# Patient Record
Sex: MALE
[De-identification: names, ages, dates, MRNs, and addresses within clinical notes are randomized per-mention and may not be internally consistent; named-entity substitution may affect disease eponyms.]

---

## 2020-07-03 ENCOUNTER — HOSPITAL ENCOUNTER (EMERGENCY)
Dept: HOSPITAL 62 - ER | Age: 35
Discharge: HOME | End: 2020-07-03
Payer: SELF-PAY

## 2020-07-03 VITALS — SYSTOLIC BLOOD PRESSURE: 129 MMHG | DIASTOLIC BLOOD PRESSURE: 77 MMHG

## 2020-07-03 DIAGNOSIS — Z91.013: ICD-10-CM

## 2020-07-03 DIAGNOSIS — R51: Primary | ICD-10-CM

## 2020-07-03 LAB
ABSOLUTE LYMPHOCYTES# (MANUAL): 4.4 10^3/UL (ref 0.5–4.7)
ABSOLUTE MONOCYTES # (MANUAL): 0.5 10^3/UL (ref 0.1–1.4)
ADD MANUAL DIFF: YES
ALBUMIN SERPL-MCNC: 4 G/DL (ref 3.5–5)
ALP SERPL-CCNC: 116 U/L (ref 38–126)
ANION GAP SERPL CALC-SCNC: 7 MMOL/L (ref 5–19)
AST SERPL-CCNC: 147 U/L (ref 17–59)
BASOPHILS NFR BLD MANUAL: 0 % (ref 0–2)
BILIRUB DIRECT SERPL-MCNC: 0.1 MG/DL (ref 0–0.4)
BILIRUB SERPL-MCNC: 0.5 MG/DL (ref 0.2–1.3)
BUN SERPL-MCNC: 15 MG/DL (ref 7–20)
BURR CELLS BLD QL SMEAR: (no result)
CALCIUM: 9.3 MG/DL (ref 8.4–10.2)
CHLORIDE SERPL-SCNC: 105 MMOL/L (ref 98–107)
CO2 SERPL-SCNC: 25 MMOL/L (ref 22–30)
EOSINOPHIL NFR BLD MANUAL: 5 % (ref 0–6)
ERYTHROCYTE [DISTWIDTH] IN BLOOD BY AUTOMATED COUNT: 14 % (ref 11.5–14)
ERYTHROCYTE [SEDIMENTATION RATE] IN BLOOD: 17 MM/HR (ref 0–15)
GLUCOSE SERPL-MCNC: 104 MG/DL (ref 75–110)
HCT VFR BLD CALC: 41.6 % (ref 37.9–51)
HGB BLD-MCNC: 13.9 G/DL (ref 13.5–17)
MCH RBC QN AUTO: 27.9 PG (ref 27–33.4)
MCHC RBC AUTO-ENTMCNC: 33.3 G/DL (ref 32–36)
MCV RBC AUTO: 84 FL (ref 80–97)
MONOCYTES % (MANUAL): 7 % (ref 3–13)
NEUTS BAND NFR BLD MANUAL: 1 % (ref 3–5)
PLATELET # BLD: 227 10^3/UL (ref 150–450)
PLATELET COMMENT: ADEQUATE
POTASSIUM SERPL-SCNC: 3.9 MMOL/L (ref 3.6–5)
PROT SERPL-MCNC: 8 G/DL (ref 6.3–8.2)
RBC # BLD AUTO: 4.97 10^6/UL (ref 4.35–5.55)
SCHISTOCYTES BLD QL SMEAR: SLIGHT
SEGMENTED NEUTROPHILS % (MAN): 27 % (ref 42–78)
TOTAL CELLS COUNTED BLD: 100
VARIANT LYMPHS NFR BLD MANUAL: 54 % (ref 13–45)
WBC # BLD AUTO: 7.3 10^3/UL (ref 4–10.5)

## 2020-07-03 PROCEDURE — 99284 EMERGENCY DEPT VISIT MOD MDM: CPT

## 2020-07-03 PROCEDURE — 96374 THER/PROPH/DIAG INJ IV PUSH: CPT

## 2020-07-03 PROCEDURE — 85652 RBC SED RATE AUTOMATED: CPT

## 2020-07-03 PROCEDURE — 85025 COMPLETE CBC W/AUTO DIFF WBC: CPT

## 2020-07-03 PROCEDURE — 36415 COLL VENOUS BLD VENIPUNCTURE: CPT

## 2020-07-03 PROCEDURE — 96361 HYDRATE IV INFUSION ADD-ON: CPT

## 2020-07-03 PROCEDURE — 80053 COMPREHEN METABOLIC PANEL: CPT

## 2020-07-03 PROCEDURE — 70450 CT HEAD/BRAIN W/O DYE: CPT

## 2020-07-03 NOTE — RADIOLOGY REPORT (SQ)
EXAM DESCRIPTION:







CLINICAL HISTORY: 



34 years  Male  skull fx on xrays at dr's office 2day +HAx1m



COMPARISON: 



None



TECHNIQUE: 



Axial images without IV contrast. Sagittal coronal

reconstruction. This exam was performed according to our

departmental dose-optimization program, which includes automated

exposure control, adjustment of the mA and/or kV according to

patient size and/or use of iterative reconstruction technique..



FINDINGS: 



Normal size ventricles. No suspicious intra-axial or extra-axial

abnormality. Minimal inflammatory changes in the left maxillary

sinus. Mastoid air cells are unremarkable. No suspicious skull

fracture. There is mild sclerosis surrounding the lambdoid suture

posteriorly with very minimal central lucency. Series 400 image

36. This is not a a skull fracture.



IMPRESSION: 





1. No suspicious intracranial abnormalities.

2. Minimal lucency and sclerosis associated with the lambdoid

suture chronic in nature. No acute skull fracture.

3. Minimal mucosal thickening or fluid in the left maxillary

sinus.

## 2020-07-03 NOTE — ER DOCUMENT REPORT
ED Medical Screen (RME)





- General


Chief Complaint: Headache


Stated Complaint: SENT FROM [facility] FOR CT


Time Seen by Provider: 07/03/20 19:50





- HPI


Notes: 





07/03/20 20:01


34-year-old male presents emergency room from [facility] due to a skull 

fracture seen on x-rays that they did today for complaint of a headache x1 

month.  Patient states he has not had any head trauma within the last month.  

Reports he is having blurred vision intermittently in his eyes for the last 

month.  His wife encouraged him to go to [facility] for evaluation today which

they saw a skull fracture when they did a plain image of his skull.  Patient 

states he fell and hit his head when he was improved In 2005, never had it 

checked out.  Denies any chest pain shortness of breath, nausea vomiting or 

diarrhea, denies any history of previous headaches.





I have greeted and performed a rapid initial assessment of this patient.  A 

comprehensive ED assessment and evaluation of the patient, analysis of test 

results and completion of the medical decision making process will be conducted 

by additional ED providers.





PHYSICAL EXAMINATION:





GENERAL: Well-appearing, well-nourished and in no acute distress.





HEAD: Atraumatic, normocephalic.





EYES: Pupils equal round extraocular movements intact,  conjunctiva are normal.





NECK: Normal range of motion





CV: s1, s2 regular 





LUNGS: No respiratory distress





Musculoskeletal: Normal range of motion





NEUROLOGICAL:  Normal speech, normal gait. 





SKIN: Warm, Dry, normal turgor, no rashes or lesions noted.





- Related Data


Allergies/Adverse Reactions: 


                                        





Shellfish * [Shellfish] Allergy (Verified 07/03/20 19:49)


   











Past Medical History





- Social History


Frequency of alcohol use: None


Drug Abuse: None





Physical Exam





- Vital signs


Vitals: 





                                        











Temp Pulse Resp BP Pulse Ox


 


 98.6 F   84   20   124/76   97 


 


 07/03/20 18:53  07/03/20 18:53  07/03/20 18:53  07/03/20 18:53  07/03/20 18:53














Course





- Vital Signs


Vital signs: 





                                        











Temp Pulse Resp BP Pulse Ox


 


 98.6 F   84   20   124/76   97 


 


 07/03/20 19:49  07/03/20 18:53  07/03/20 18:53  07/03/20 18:53  07/03/20 18:53

## 2020-07-03 NOTE — ER DOCUMENT REPORT
ED Headache





- General


Chief Complaint: Headache


Stated Complaint: SENT FROM [facility] FOR CT


Time Seen by Provider: 07/03/20 19:50


Mode of Arrival: Ambulatory


Information source: Patient


Notes: 





Otherwise healthy 34-year-old male presenting to the emergency department with 

concern that he may have a possible skull fracture.  Patient reports he has had 

a headache intermittently over the last month.  He went to an urgent care today 

where they did skull imaging via plain x-rays and told him that he had a skull 

fracture and needed to come to the emergency department.  Patient was seen by obey reid in triage prior to my evaluation and he has had medications ordered.











- Related Data


Allergies/Adverse Reactions: 


                                        





Shellfish * [Shellfish] Allergy (Verified 07/03/20 19:49)


   











Past Medical History





- General


Information source: Patient





- Social History


Smoking Status: Never Smoker


Frequency of alcohol use: None


Drug Abuse: None


Family History: Reviewed & Not Pertinent


Patient has homicidal ideation: No





- Medical History


Medical History: Negative


Surgical Hx: Negative





- Immunizations


Immunizations up to date: Yes





Review of Systems





- Review of Systems


Constitutional: Other - Headache


EENT: No symptoms reported


Cardiovascular: No symptoms reported


Respiratory: No symptoms reported


Gastrointestinal: No symptoms reported


Genitourinary: No symptoms reported


Male Genitourinary: No symptoms reported


Musculoskeletal: No symptoms reported


Skin: No symptoms reported


Hematologic/Lymphatic: No symptoms reported


Neurological/Psychological: No symptoms reported





Physical Exam





- Vital signs


Vitals: 


                                        











Temp Pulse Resp BP Pulse Ox


 


 98.6 F   84   20   124/76   97 


 


 07/03/20 18:53  07/03/20 18:53  07/03/20 18:53  07/03/20 18:53  07/03/20 18:53














- Notes


Notes: 





PHYSICAL EXAMINATION:





GENERAL: Well-appearing, well-nourished and in no acute distress.





HEAD: Atraumatic, normocephalic.





EYES: Pupils equal round and reactive to light, extraocular movements intact, 

sclera anicteric, conjunctiva are normal.





ENT: Nares patent, oropharynx clear without exudates.  Moist mucous membranes.





NECK: Normal range of motion, supple without lymphadenopathy





LUNGS: Breath sounds clear to auscultation bilaterally and equal.  No wheezes 

rales or rhonchi.





HEART: Regular rate and rhythm without murmurs





ABDOMEN: Soft, nontender, nondistended abdomen.  No guarding, no rebound.  No 

masses appreciated.





Musculoskeletal: Normal range of motion, no pitting or edema.  No cyanosis.





NEUROLOGICAL: Cranial nerves grossly intact.  Normal speech, normal gait.  

Normal sensory, motor exams 





PSYCH: Normal mood, normal affect.





SKIN: Warm, Dry, normal turgor, no rashes or lesions noted.





Course





- Re-evaluation


Re-evalutation: 





CT of the head shows no suspicious intracranial abnormalities, no skull 

fracture.  Patient reports his headache has completely resolved after 

administration of migraine cocktail here in the emergency department.  He will 

be sent home with a prescription for Fioricet to use as needed, he will follow-

up with his primary care.








- Vital Signs


Vital signs: 


                                        











Temp Pulse Resp BP Pulse Ox


 


 98.6 F   86   16   129/77 H  97 


 


 07/03/20 22:59  07/03/20 22:59  07/03/20 22:59  07/03/20 22:59  07/03/20 22:59














- Laboratory


Result Diagrams: 


                                 07/03/20 20:40





                                 07/03/20 20:40


Laboratory results interpreted by me: 


                                        











  07/03/20 07/03/20





  20:40 20:40


 


Seg Neuts % (Manual)  27 L 


 


Band Neutrophils %  1 L 


 


Lymphocytes % (Manual)  54 H 


 


ESR  17 H 


 


AST   147 H


 


ALT   173 H














Discharge





- Discharge


Clinical Impression: 


 Headache





Condition: Stable


Disposition: HOME, SELF-CARE


Additional Instructions: 


The CAT scan of your head was normal, there was no skull fracture noted.





You were seen today for a headache.  Please follow-up with your primary care 

doctor regarding today's ED visit.  Return to emergency department immediately 

if you develop a headache that gets to its maximum severity within 20 minutes of

 onset, you pass out, you develop weakness, numbness, changes in your vision, 

become unable to keep any fluids down for more than 12 hours, or develop a fever

 greater than 100.4 degrees Fahrenheit.





If you develop a similar migraine headache in the future I recommend that you 

immediately take 600 mg of ibuprofen and 50 mg of Benadryl and go to sleep as 

quickly as possible.  This can often prevent your migraine headache from 

becoming severe.  Take the medication that I have prescribed as directed and 

only as needed if the ibuprofen does not help.





Prescriptions: 


Butalb/Acetaminophen/Caffeine [Fioricet (-40 mg) Tablet] 1 tab PO Q4HP PRN

#20 tab


 PRN Reason: